# Patient Record
Sex: FEMALE | Race: BLACK OR AFRICAN AMERICAN
[De-identification: names, ages, dates, MRNs, and addresses within clinical notes are randomized per-mention and may not be internally consistent; named-entity substitution may affect disease eponyms.]

---

## 2018-06-27 ENCOUNTER — HOSPITAL ENCOUNTER (OUTPATIENT)
Dept: HOSPITAL 74 - FASU-ENDO | Age: 58
Discharge: HOME | End: 2018-06-27
Attending: INTERNAL MEDICINE
Payer: COMMERCIAL

## 2018-06-27 VITALS — DIASTOLIC BLOOD PRESSURE: 68 MMHG | HEART RATE: 62 BPM | SYSTOLIC BLOOD PRESSURE: 115 MMHG

## 2018-06-27 VITALS — BODY MASS INDEX: 27 KG/M2

## 2018-06-27 VITALS — TEMPERATURE: 98.2 F

## 2018-06-27 DIAGNOSIS — Z12.11: Primary | ICD-10-CM

## 2018-06-27 DIAGNOSIS — K29.50: ICD-10-CM

## 2018-06-27 PROCEDURE — 0DB98ZX EXCISION OF DUODENUM, VIA NATURAL OR ARTIFICIAL OPENING ENDOSCOPIC, DIAGNOSTIC: ICD-10-PCS | Performed by: INTERNAL MEDICINE

## 2018-06-27 PROCEDURE — 0DJD8ZZ INSPECTION OF LOWER INTESTINAL TRACT, VIA NATURAL OR ARTIFICIAL OPENING ENDOSCOPIC: ICD-10-PCS | Performed by: INTERNAL MEDICINE

## 2018-06-27 PROCEDURE — 0DB68ZX EXCISION OF STOMACH, VIA NATURAL OR ARTIFICIAL OPENING ENDOSCOPIC, DIAGNOSTIC: ICD-10-PCS | Performed by: INTERNAL MEDICINE

## 2018-06-28 NOTE — PATH
Surgical Pathology Report



Patient Name:  RENNY FERRERA

Accession #:  [id number]

Med. Rec. #:  [id number]                                                        

   /Age/Gender:  1960 (Age: 58) / F

Account:  [id number]                                                          

             Location: [facility]-ENDOSCOPY

Taken:  2018

Received:  2018

Reported:  2018

Physicians:  Mendel Newell M.D.

  



Specimen(s) Received

A: BX DUODENUM 

B: BX ANTRUM 





Clinical History

GERD, rule out colon cancer

Postoperative diagnosis: Abdominal pain, rule out celiac disease, mild gastritis







Final Diagnosis

A. DUODENUM, BIOPSY:

DUODENAL MUCOSA WITH NO PATHOLOGIC FINDINGS.



B. ANTRUM, BIOPSY:

MILD CHRONIC GASTRITIS.

IMMUNOSTAIN IS NEGATIVE FOR H. PYLORI ORGANISMS.







***Electronically Signed***

Gita Ward M.D.





Gross Description

A.  Received in formalin, labeled "duodenum" are 2 tan, irregular portions of

soft tissue measuring 0.4 and 0.5 cm. in greatest dimension. The specimens are

submitted in toto in one cassette.



B.  Received in formalin, labeled "antrum" is a tan, irregular portion of soft

tissue measuring 0.4 cm. in greatest dimension. The specimen is submitted in

toto in one cassette.

/2018



[place]